# Patient Record
Sex: FEMALE | Race: BLACK OR AFRICAN AMERICAN
[De-identification: names, ages, dates, MRNs, and addresses within clinical notes are randomized per-mention and may not be internally consistent; named-entity substitution may affect disease eponyms.]

---

## 2022-02-23 ENCOUNTER — HOSPITAL ENCOUNTER (OUTPATIENT)
Dept: HOSPITAL 4 - SDS | Age: 62
LOS: 1 days | Discharge: HOME | End: 2022-02-24
Attending: SPECIALIST
Payer: COMMERCIAL

## 2022-02-23 VITALS — SYSTOLIC BLOOD PRESSURE: 164 MMHG

## 2022-02-23 VITALS — BODY MASS INDEX: 30.39 KG/M2 | WEIGHT: 178 LBS | HEIGHT: 64 IN

## 2022-02-23 VITALS — SYSTOLIC BLOOD PRESSURE: 142 MMHG

## 2022-02-23 DIAGNOSIS — K21.9: ICD-10-CM

## 2022-02-23 DIAGNOSIS — Z79.899: ICD-10-CM

## 2022-02-23 DIAGNOSIS — I10: ICD-10-CM

## 2022-02-23 DIAGNOSIS — Z20.822: ICD-10-CM

## 2022-02-23 DIAGNOSIS — N81.10: Primary | ICD-10-CM

## 2022-02-23 DIAGNOSIS — E11.40: ICD-10-CM

## 2022-02-23 DIAGNOSIS — N81.6: ICD-10-CM

## 2022-02-23 PROCEDURE — 88305 TISSUE EXAM BY PATHOLOGIST: CPT

## 2022-02-23 PROCEDURE — C9290 INJ, BUPIVACAINE LIPOSOME: HCPCS

## 2022-02-23 PROCEDURE — 36415 COLL VENOUS BLD VENIPUNCTURE: CPT

## 2022-02-23 PROCEDURE — 57260 CMBN ANT PST COLPRHY: CPT

## 2022-02-23 PROCEDURE — 87426 SARSCOV CORONAVIRUS AG IA: CPT

## 2022-02-23 PROCEDURE — 82962 GLUCOSE BLOOD TEST: CPT

## 2022-02-23 PROCEDURE — 71046 X-RAY EXAM CHEST 2 VIEWS: CPT

## 2022-02-23 PROCEDURE — U0003 INFECTIOUS AGENT DETECTION BY NUCLEIC ACID (DNA OR RNA); SEVERE ACUTE RESPIRATORY SYNDROME CORONAVIRUS 2 (SARS-COV-2) (CORONAVIRUS DISEASE [COVID-19]), AMPLIFIED PROBE TECHNIQUE, MAKING USE OF HIGH THROUGHPUT TECHNOLOGIES AS DESCRIBED BY CMS-2020-01-R: HCPCS

## 2022-02-23 RX ADMIN — DEXTROSE MONOHYDRATE SCH MLS/HR: 50 INJECTION, SOLUTION INTRAVENOUS at 22:10

## 2022-02-23 RX ADMIN — HYDROCODONE BITARTRATE AND ACETAMINOPHEN PRN TAB: 5; 325 TABLET ORAL at 20:11

## 2022-02-23 RX ADMIN — OXYCODONE HYDROCHLORIDE AND ACETAMINOPHEN PRN TAB: 5; 325 TABLET ORAL at 13:09

## 2022-02-23 RX ADMIN — OXYCODONE HYDROCHLORIDE AND ACETAMINOPHEN PRN TAB: 5; 325 TABLET ORAL at 17:01

## 2022-02-23 RX ADMIN — SODIUM CHLORIDE, SODIUM LACTATE, POTASSIUM CHLORIDE, AND CALCIUM CHLORIDE SCH MLS/HR: 600; 310; 30; 20 INJECTION, SOLUTION INTRAVENOUS at 21:03

## 2022-02-23 RX ADMIN — OXYCODONE HYDROCHLORIDE AND ACETAMINOPHEN PRN TAB: 5; 325 TABLET ORAL at 23:12

## 2022-02-23 RX ADMIN — SODIUM CHLORIDE, SODIUM LACTATE, POTASSIUM CHLORIDE, AND CALCIUM CHLORIDE SCH MLS/HR: 600; 310; 30; 20 INJECTION, SOLUTION INTRAVENOUS at 13:10

## 2022-02-23 RX ADMIN — DEXTROSE MONOHYDRATE SCH MLS/HR: 50 INJECTION, SOLUTION INTRAVENOUS at 15:56

## 2022-02-23 NOTE — NUR
CLOSING NOTE

Patient in bed resting,  at bedside. No sign of shortness of breath on room air. 
Patient complaining of pain in the low back, offered to reposition and provide pillow 
support until its time for the next pain medication administration. Patient states that pain 
is manageable at this time. IV site is clean, dry, intact and patent. Arora intact and 
draining clear yellow urine. All needs met at this time, safety checks made and report given 
to oncoming night nurse.

## 2022-02-23 NOTE — NUR
ROUNDS

Patient awake, alert, and oriented x4. Vital signs within normal limits. Complaints of pain 
in the right hip/abdomen (6/10). A small amount of vaginal discharge, scant bleeding, seen 
on the pad. Patient states that she feels good, has been able to tolerate ice chips and some 
jello. Will continue to monitor.

## 2022-02-23 NOTE — NUR
ADMISSION NOTE

Received patient from PACU via gurney. Patient is awake, alert, oriented X4 . Patient 
oriented to hospital room, call light, toileting, pain management and safety-teach back 
done. Patient informed that Niki will be their nurse and that their room number is 129A. 
Personal belongings checked and Belongings List documented. Call light within reach.

## 2022-02-24 VITALS — SYSTOLIC BLOOD PRESSURE: 156 MMHG

## 2022-02-24 VITALS — SYSTOLIC BLOOD PRESSURE: 164 MMHG

## 2022-02-24 RX ADMIN — OXYCODONE HYDROCHLORIDE AND ACETAMINOPHEN PRN TAB: 5; 325 TABLET ORAL at 03:03

## 2022-02-24 RX ADMIN — DEXTROSE MONOHYDRATE SCH MLS/HR: 50 INJECTION, SOLUTION INTRAVENOUS at 05:38

## 2022-02-24 RX ADMIN — SODIUM CHLORIDE, SODIUM LACTATE, POTASSIUM CHLORIDE, AND CALCIUM CHLORIDE SCH MLS/HR: 600; 310; 30; 20 INJECTION, SOLUTION INTRAVENOUS at 05:31

## 2022-02-24 RX ADMIN — HYDROCODONE BITARTRATE AND ACETAMINOPHEN PRN TAB: 5; 325 TABLET ORAL at 08:19

## 2022-02-24 NOTE — NUR
Patient observed to ambulate without help to the bathroom. I later confirmed that she 
successfully urinated without any pain or discomfort.

## 2022-02-24 NOTE — NUR
Arora catheter removed. I instructed patient to sit at bedside before getting out of bed. 
She denies any pain or discomfort.

## 2023-05-12 ENCOUNTER — HOSPITAL ENCOUNTER (OUTPATIENT)
Dept: HOSPITAL 4 - SRD | Age: 63
Discharge: HOME | End: 2023-05-12
Payer: COMMERCIAL

## 2023-05-12 DIAGNOSIS — G89.4: ICD-10-CM

## 2023-05-12 DIAGNOSIS — M47.816: ICD-10-CM

## 2023-05-12 DIAGNOSIS — M54.41: ICD-10-CM

## 2023-05-12 DIAGNOSIS — Z01.818: Primary | ICD-10-CM

## 2023-05-12 DIAGNOSIS — M96.1: ICD-10-CM

## 2023-05-12 DIAGNOSIS — M54.17: ICD-10-CM
